# Patient Record
Sex: FEMALE | Race: WHITE | NOT HISPANIC OR LATINO | Employment: FULL TIME | URBAN - METROPOLITAN AREA
[De-identification: names, ages, dates, MRNs, and addresses within clinical notes are randomized per-mention and may not be internally consistent; named-entity substitution may affect disease eponyms.]

---

## 2017-07-13 ENCOUNTER — OFFICE VISIT (OUTPATIENT)
Dept: FAMILY MEDICINE CLINIC | Facility: CLINIC | Age: 62
End: 2017-07-13

## 2017-07-13 VITALS
HEART RATE: 60 BPM | OXYGEN SATURATION: 98 % | SYSTOLIC BLOOD PRESSURE: 118 MMHG | TEMPERATURE: 98.1 F | DIASTOLIC BLOOD PRESSURE: 76 MMHG | HEIGHT: 63 IN | WEIGHT: 189 LBS | BODY MASS INDEX: 33.49 KG/M2

## 2017-07-13 DIAGNOSIS — H10.30 ACUTE BACTERIAL CONJUNCTIVITIS, UNSPECIFIED LATERALITY: Primary | ICD-10-CM

## 2017-07-13 DIAGNOSIS — J30.1 NON-SEASONAL ALLERGIC RHINITIS DUE TO POLLEN: ICD-10-CM

## 2017-07-13 DIAGNOSIS — J45.20 MILD INTERMITTENT ASTHMA WITHOUT COMPLICATION: ICD-10-CM

## 2017-07-13 DIAGNOSIS — F32.A DEPRESSION, UNSPECIFIED DEPRESSION TYPE: ICD-10-CM

## 2017-07-13 PROCEDURE — 99214 OFFICE O/P EST MOD 30 MIN: CPT | Performed by: PHYSICIAN ASSISTANT

## 2017-07-13 RX ORDER — ESCITALOPRAM OXALATE 10 MG/1
10 TABLET ORAL DAILY
COMMUNITY
End: 2017-07-13 | Stop reason: SDUPTHER

## 2017-07-13 RX ORDER — ALBUTEROL SULFATE 90 UG/1
2 AEROSOL, METERED RESPIRATORY (INHALATION) EVERY 4 HOURS PRN
Qty: 1 INHALER | Refills: 5 | Status: SHIPPED | OUTPATIENT
Start: 2017-07-13 | End: 2018-12-27 | Stop reason: SDUPTHER

## 2017-07-13 RX ORDER — ESCITALOPRAM OXALATE 10 MG/1
10 TABLET ORAL DAILY
Qty: 30 TABLET | Refills: 11 | Status: SHIPPED | OUTPATIENT
Start: 2017-07-13 | End: 2018-06-28 | Stop reason: SDUPTHER

## 2017-07-13 RX ORDER — CIPROFLOXACIN HYDROCHLORIDE 3.5 MG/ML
1 SOLUTION/ DROPS TOPICAL
Qty: 10 ML | Refills: 0 | Status: SHIPPED | OUTPATIENT
Start: 2017-07-13 | End: 2018-01-11 | Stop reason: SDUPTHER

## 2017-07-13 RX ORDER — CYCLOBENZAPRINE HCL 10 MG
10 TABLET ORAL 3 TIMES DAILY PRN
Qty: 30 TABLET | Refills: 2 | Status: SHIPPED | OUTPATIENT
Start: 2017-07-13 | End: 2018-12-27 | Stop reason: SDUPTHER

## 2017-07-13 NOTE — PROGRESS NOTES
Subjective   Adamaris Buck is a 62 y.o. female    History of Present Illness  Patient presents today for follow-up on one uncontrolled chronic medical problem, 2 uncontrolled chronic medical problems and one new medical problem.  Regarding her depression she states Lexapro has been a life saver.  She states it works very well for her and she does not have problems at all with depression since taking this medication.  She would like to continue on this.  She denies he problems with it.  Patient has chronic mild intermittent asthma which has been bothering her more lately with flares.  She needs a refill of inhaler, her current one is .  She has been taking loratadine daily for chronic allergies and states this is not well controlled either and she feels that it is causing her asthma to flare.  In addition to that she has chronic allergic problems with itching and dryness in her eyes and lately she has developed a film over the top surface of her eyes especially her left eye has caused some blurriness to her vision.  She has been seen by ophthalmology since her symptoms began and was told she had a normal eye exam.  She has tried over-the-counter eyedrops without relief.  The following portions of the patient's history were reviewed and updated as appropriate: allergies, current medications, past social history and problem list    Review of Systems   Constitutional: Negative for appetite change, chills, fatigue and fever.   HENT: Positive for congestion, postnasal drip, rhinorrhea, sneezing and sore throat. Negative for ear pain, hearing loss, sinus pressure and trouble swallowing.    Eyes: Positive for discharge, itching and visual disturbance.   Respiratory: Positive for wheezing. Negative for cough, chest tightness and shortness of breath.    Cardiovascular: Negative for chest pain.   Gastrointestinal: Negative for abdominal pain, diarrhea and nausea.   Neurological: Negative for dizziness, tremors, weakness,  light-headedness and headaches.   Psychiatric/Behavioral: Negative for agitation, behavioral problems, confusion, decreased concentration, dysphoric mood, sleep disturbance and suicidal ideas. The patient is not nervous/anxious.        Objective     Vitals:    07/13/17 1436   BP: 118/76   Pulse: 60   Temp: 98.1 °F (36.7 °C)   SpO2: 98%       Physical Exam   Constitutional: She is oriented to person, place, and time. She appears well-developed and well-nourished. No distress.   HENT:   Head: Normocephalic and atraumatic.   Right Ear: External ear normal.   Left Ear: External ear normal.   Nose: Nose normal.   Mouth/Throat: Oropharynx is clear and moist.   Eyes: Conjunctivae and EOM are normal. Pupils are equal, round, and reactive to light. Right eye exhibits no discharge. Left eye exhibits no discharge. No scleral icterus.   Neck: No thyroid mass and no thyromegaly present.   Cardiovascular: Normal rate, regular rhythm and normal heart sounds.    Pulmonary/Chest: Effort normal and breath sounds normal.   Neurological: She is alert and oriented to person, place, and time.   Skin: She is not diaphoretic.   Psychiatric: Her speech is normal and behavior is normal. Judgment and thought content normal. Her mood appears not anxious. Her affect is not angry and not inappropriate. Cognition and memory are normal. She does not exhibit a depressed mood. She is attentive.   Nursing note and vitals reviewed.      Assessment/Plan     Diagnoses and all orders for this visit:    Acute bacterial conjunctivitis, unspecified laterality  -     ciprofloxacin (CILOXAN) 0.3 % ophthalmic solution; Administer 1 drop to both eyes Every 2 (Two) Hours. Decrease to dose to every 4 hours on day 3    Depression, unspecified depression type  -     escitalopram (LEXAPRO) 10 MG tablet; Take 1 tablet by mouth Daily.    Mild intermittent asthma without complication    Non-seasonal allergic rhinitis due to pollen    Other orders  -     Discontinue:  escitalopram (LEXAPRO) 10 MG tablet; Take 10 mg by mouth Daily.  -     Cholecalciferol (VITAMIN D3) 5000 UNITS capsule capsule; Take 5,000 Units by mouth Daily.  -     LORATADINE PO; Take 1 tablet by mouth Daily.  -     Multiple Vitamin (MULTI VITAMIN PO); Take 1 tablet by mouth Daily.  -     BIOTIN PO; Take 1 tablet by mouth Daily.  -     cyclobenzaprine (FLEXERIL) 10 MG tablet; Take 1 tablet by mouth 3 (Three) Times a Day As Needed for Muscle Spasms.  -     albuterol (PROVENTIL HFA;VENTOLIN HFA) 108 (90 BASE) MCG/ACT inhaler; Inhale 2 puffs Every 4 (Four) Hours As Needed for Wheezing.    Advised patient to discontinue loratadine and start on Zyrtec 10 mg daily over-the-counter for allergic rhinitis, prescription for pro-air inhaler to use as needed for asthma, if symptoms asthma persist will add on a steroid inhaler such as Advair.  Follow-up in one year for recheck on depression.

## 2017-09-07 ENCOUNTER — TELEPHONE (OUTPATIENT)
Dept: FAMILY MEDICINE CLINIC | Facility: CLINIC | Age: 62
End: 2017-09-07

## 2017-09-07 RX ORDER — MONTELUKAST SODIUM 10 MG/1
10 TABLET ORAL NIGHTLY
Qty: 30 TABLET | Refills: 5 | Status: SHIPPED | OUTPATIENT
Start: 2017-09-07 | End: 2017-11-01 | Stop reason: SDUPTHER

## 2017-09-07 NOTE — TELEPHONE ENCOUNTER
Please call in Advair 250/50 one puff twice daily dispensed #1 disc with 5 refills and singular 10 mg 1 daily at bedtime #30 with 5 refills.

## 2017-09-07 NOTE — TELEPHONE ENCOUNTER
----- Message from Maryam Espitia sent at 9/7/2017  9:43 AM EDT -----  Contact: PATIENT  SHE IS HAVING ASTHMA EXACERBATION AND HAD DISCUSS THIS AT LAST APPOINTMENT THAT IF IT HAPPENED TO ADD CINGULAR AND ADVAIR 250/50. CAN THOSE BE CALLED IN FOR HER ASAP TODAY.     [address] - [phone]  - [phone]  [phone] (Phone)  [phone] (Fax)

## 2017-11-01 RX ORDER — MONTELUKAST SODIUM 10 MG/1
10 TABLET ORAL NIGHTLY
Qty: 90 TABLET | Refills: 0 | Status: SHIPPED | OUTPATIENT
Start: 2017-11-01 | End: 2018-02-12 | Stop reason: SDUPTHER

## 2018-01-09 ENCOUNTER — TELEPHONE (OUTPATIENT)
Dept: FAMILY MEDICINE CLINIC | Facility: CLINIC | Age: 63
End: 2018-01-09

## 2018-01-09 NOTE — TELEPHONE ENCOUNTER
----- Message from Renaldo Lowery CMA sent at 1/9/2018  4:09 PM EST -----  Contact: PT.      ----- Message -----     From: Ximena Miller     Sent: 1/9/2018   9:42 AM       To: [facility]    PT. SEE'S DR. HINDS.  PT. IS CURRENTLY OUT OF MEDS. OF:  EYE DROPS FOR AN EYE INFECTION.  RX=[facility].  PT. CAN BE REACHED @ [facility] #.

## 2018-01-11 DIAGNOSIS — H10.30 ACUTE BACTERIAL CONJUNCTIVITIS, UNSPECIFIED LATERALITY: ICD-10-CM

## 2018-01-11 RX ORDER — CIPROFLOXACIN HYDROCHLORIDE 3.5 MG/ML
SOLUTION/ DROPS TOPICAL
Qty: 5 ML | Refills: 0 | Status: SHIPPED | OUTPATIENT
Start: 2018-01-11 | End: 2019-11-14

## 2018-02-12 RX ORDER — MONTELUKAST SODIUM 10 MG/1
TABLET ORAL
Qty: 90 TABLET | Refills: 0 | Status: SHIPPED | OUTPATIENT
Start: 2018-02-12 | End: 2018-06-28 | Stop reason: SDUPTHER

## 2018-06-28 ENCOUNTER — TELEPHONE (OUTPATIENT)
Dept: FAMILY MEDICINE CLINIC | Facility: CLINIC | Age: 63
End: 2018-06-28

## 2018-06-28 DIAGNOSIS — F32.A DEPRESSION, UNSPECIFIED DEPRESSION TYPE: ICD-10-CM

## 2018-06-28 RX ORDER — MONTELUKAST SODIUM 10 MG/1
10 TABLET ORAL NIGHTLY
Qty: 90 TABLET | Refills: 0 | Status: SHIPPED | OUTPATIENT
Start: 2018-06-28 | End: 2018-11-19 | Stop reason: SDUPTHER

## 2018-06-28 RX ORDER — ESCITALOPRAM OXALATE 10 MG/1
10 TABLET ORAL DAILY
Qty: 90 TABLET | Refills: 0 | Status: SHIPPED | OUTPATIENT
Start: 2018-06-28 | End: 2018-11-19 | Stop reason: SDUPTHER

## 2018-06-28 NOTE — TELEPHONE ENCOUNTER
Patient needs a follow up appt, KYLER     I refilled one 90 day supply on the meds requested but needs appt for additional refills

## 2018-06-28 NOTE — TELEPHONE ENCOUNTER
----- Message from Ximenavianca Miller sent at 6/28/2018  3:23 PM EDT -----  Contact: PT.  PT. SEE'S DR. HINDS.  PT. IS NEEDING A REFILL ON: escitalopram (LEXAPRO) 10 MG tablet 30 tablet 11 7/13/2017    Sig - Route: Take 1 tablet by mouth Daily. - Oral     & montelukast (SINGULAIR) 10 MG tablet 90 tablet 0 2/12/2018    Sig: TAKE 1 TABLET BY MOUTH EVERY NIGHT     & fluticasone-salmeterol (ADVAIR DISKUS) 250-50 MCG/DOSE DISKUS 3 each 2 12/26/2017    Sig - Route: Inhale 1 puff 2 (Two) Times a Day. - Inhalation     #PT. WOULD LIKE A #90 DAY SUPPLY ON ALL ABOVE MEDS.#    RX=fluticasone-salmeterol (ADVAIR DISKUS) 250-50 MCG/DOSE DISKUS 3 each 2 12/26/2017    Sig - Route: Inhale 1 puff 2 (Two) Times a Day. - Inhalation     PT. CAN BE REACHED @ [facility] #.

## 2018-11-19 DIAGNOSIS — F32.A DEPRESSION, UNSPECIFIED DEPRESSION TYPE: ICD-10-CM

## 2018-11-19 RX ORDER — MONTELUKAST SODIUM 10 MG/1
10 TABLET ORAL NIGHTLY
Qty: 90 TABLET | Refills: 0 | Status: SHIPPED | OUTPATIENT
Start: 2018-11-19 | End: 2018-12-27 | Stop reason: SDUPTHER

## 2018-11-19 RX ORDER — ESCITALOPRAM OXALATE 10 MG/1
10 TABLET ORAL DAILY
Qty: 90 TABLET | Refills: 0 | Status: SHIPPED | OUTPATIENT
Start: 2018-11-19 | End: 2018-12-27 | Stop reason: SDUPTHER

## 2018-11-19 NOTE — TELEPHONE ENCOUNTER
PATIENT HAS CALLED REQUESTING A REFILL FOR SNGULAR AND LEXAPRO TO BE SENT TO [facility]. PATIENT IS REQUESTING FOR ENOUGH REFILLS ON MEDICATION UNTIL THEY ARE CALLED INTO [facility].  SHE CAN BE REACHED [phone]

## 2018-12-27 ENCOUNTER — OFFICE VISIT (OUTPATIENT)
Dept: FAMILY MEDICINE CLINIC | Facility: CLINIC | Age: 63
End: 2018-12-27

## 2018-12-27 VITALS
HEART RATE: 61 BPM | DIASTOLIC BLOOD PRESSURE: 82 MMHG | BODY MASS INDEX: 35.7 KG/M2 | HEIGHT: 62 IN | SYSTOLIC BLOOD PRESSURE: 130 MMHG | WEIGHT: 194 LBS | TEMPERATURE: 97.8 F | OXYGEN SATURATION: 98 %

## 2018-12-27 DIAGNOSIS — F32.A DEPRESSION, UNSPECIFIED DEPRESSION TYPE: Primary | ICD-10-CM

## 2018-12-27 DIAGNOSIS — J30.1 NON-SEASONAL ALLERGIC RHINITIS DUE TO POLLEN: ICD-10-CM

## 2018-12-27 DIAGNOSIS — H04.203 EXCESSIVE TEAR PRODUCTION OF BOTH LACRIMAL GLANDS: ICD-10-CM

## 2018-12-27 DIAGNOSIS — M54.2 NECK PAIN: ICD-10-CM

## 2018-12-27 DIAGNOSIS — J45.20 MILD INTERMITTENT ASTHMA WITHOUT COMPLICATION: ICD-10-CM

## 2018-12-27 PROCEDURE — 99214 OFFICE O/P EST MOD 30 MIN: CPT | Performed by: FAMILY MEDICINE

## 2018-12-27 RX ORDER — ALBUTEROL SULFATE 90 UG/1
2 AEROSOL, METERED RESPIRATORY (INHALATION) EVERY 4 HOURS PRN
Qty: 3 INHALER | Refills: 3 | Status: SHIPPED | OUTPATIENT
Start: 2018-12-27 | End: 2019-11-14 | Stop reason: SDUPTHER

## 2018-12-27 RX ORDER — MONTELUKAST SODIUM 10 MG/1
10 TABLET ORAL NIGHTLY
Qty: 90 TABLET | Refills: 3 | Status: SHIPPED | OUTPATIENT
Start: 2018-12-27 | End: 2019-09-25 | Stop reason: SDUPTHER

## 2018-12-27 RX ORDER — ESCITALOPRAM OXALATE 10 MG/1
10 TABLET ORAL DAILY
Qty: 90 TABLET | Refills: 3 | Status: SHIPPED | OUTPATIENT
Start: 2018-12-27 | End: 2019-08-21 | Stop reason: SDUPTHER

## 2018-12-27 RX ORDER — AZITHROMYCIN 250 MG/1
TABLET, FILM COATED ORAL
Qty: 6 TABLET | Refills: 1 | Status: SHIPPED | OUTPATIENT
Start: 2018-12-27 | End: 2019-11-14

## 2018-12-27 RX ORDER — CYCLOBENZAPRINE HCL 10 MG
10 TABLET ORAL 3 TIMES DAILY PRN
Qty: 30 TABLET | Refills: 2 | Status: SHIPPED | OUTPATIENT
Start: 2018-12-27

## 2018-12-28 NOTE — PROGRESS NOTES
Subjective   Adamaris Buck is a 63 y.o. female    Chief Complaint    Depression  Asthma  Allergies  Neck pain    History of Present Illness   Patient presents today for follow-up visit.  She has not been seen for many months.  Chronic problems as listed above under chief complaint.  She is due for refill on multiple medications.  She feels she is doing well.  She feels her medications have been helpful without adverse effect.  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      The following portions of the patient's history were reviewed and updated as appropriate: allergies, current medications, past social history and problem list    Review of Systems   Constitutional: Negative for chills, fatigue and fever.   HENT: Positive for postnasal drip, rhinorrhea, sneezing and sore throat. Negative for congestion, ear pain, hearing loss, sinus pressure and trouble swallowing.    Eyes: Positive for itching.   Respiratory: Negative for cough, chest tightness, shortness of breath and wheezing.    Cardiovascular: Negative for chest pain, palpitations and leg swelling.   Gastrointestinal: Negative for abdominal pain, nausea and vomiting.   Endocrine: Negative for polydipsia, polyphagia and polyuria.   Genitourinary: Negative for dysuria, frequency and urgency.   Musculoskeletal: Negative for arthralgias, back pain and myalgias.   Skin: Negative for color change and rash.   Neurological: Negative for weakness and headaches.   Hematological: Negative for adenopathy. Does not bruise/bleed easily.       Objective     Vitals:    12/27/18 0850   BP: 130/82   Pulse: 61   Temp: 97.8 °F (36.6 °C)   SpO2: 98%       Physical Exam   Constitutional: She is oriented to person, place, and time. She appears well-developed and well-nourished. No distress.   HENT:   Head: Normocephalic and atraumatic.   Right Ear: External ear normal.   Left Ear: External ear normal.   Nose: Nose normal.    Mouth/Throat: Oropharynx is clear and moist.   Eyes: Conjunctivae are normal. Pupils are equal, round, and reactive to light.   Neck: Neck supple. No thyromegaly present.   Cardiovascular: Normal rate, regular rhythm and normal heart sounds.   No murmur heard.  Pulmonary/Chest: Effort normal and breath sounds normal. No respiratory distress. She has no wheezes. She has no rales. She exhibits no tenderness.   Abdominal: Soft. Bowel sounds are normal. There is no tenderness.   Lymphadenopathy:     She has no cervical adenopathy.   Neurological: She is alert and oriented to person, place, and time.   Skin: Skin is warm and dry. No rash noted. She is not diaphoretic.   Psychiatric: She has a normal mood and affect. Her behavior is normal.   Nursing note and vitals reviewed.      Assessment/Plan   Problem List Items Addressed This Visit     None      Visit Diagnoses     Depression, unspecified depression type    -  Primary    Relevant Medications    escitalopram (LEXAPRO) 10 MG tablet    Mild intermittent asthma without complication        Relevant Medications    albuterol sulfate  (90 Base) MCG/ACT inhaler    fluticasone-salmeterol (ADVAIR DISKUS) 250-50 MCG/DOSE DISKUS    montelukast (SINGULAIR) 10 MG tablet    Non-seasonal allergic rhinitis due to pollen        Relevant Medications    montelukast (SINGULAIR) 10 MG tablet    Neck pain        Relevant Medications    cyclobenzaprine (FLEXERIL) 10 MG tablet    Excessive tear production of both lacrimal glands

## 2019-01-02 DIAGNOSIS — F32.A DEPRESSION, UNSPECIFIED DEPRESSION TYPE: ICD-10-CM

## 2019-01-02 NOTE — TELEPHONE ENCOUNTER
----- Message from Maryam Espitia sent at 1/2/2019 12:20 PM EST -----  Contact: PATIENT  SHE SAID [facility] WILL NOT SEND HER MEDICINE TO HER BECAUSE THERE IS A PROBLEM: SHE WOULD LIKE SOMEONE TO CHECK ON THIS AND LET HER KNOW ASAP.      escitalopram (LEXAPRO) 10 MG tablet 90 tablet    Sig - Route: Take 1 tablet by mouth Daily. - Oral   Sent to pharmacy as: Escitalopram Oxalate 10 MG Oral Tablet   E-Prescribing Status: Receipt confirmed by pharmacy (12/27/2018  2:07 PM EST)   Associated Diagnoses     Depression, unspecified depression type  - Primary     Pharmacy     [facility] - [address] [phone] [facility] [phone] FX

## 2019-01-22 RX ORDER — ESCITALOPRAM OXALATE 10 MG/1
10 TABLET ORAL DAILY
Qty: 90 TABLET | Refills: 3 | OUTPATIENT
Start: 2019-01-22

## 2019-01-22 NOTE — TELEPHONE ENCOUNTER
I [facility] with a 90 day supply and 3 refills.

## 2019-08-21 DIAGNOSIS — F32.A DEPRESSION, UNSPECIFIED DEPRESSION TYPE: ICD-10-CM

## 2019-08-21 DIAGNOSIS — J45.20 MILD INTERMITTENT ASTHMA WITHOUT COMPLICATION: ICD-10-CM

## 2019-08-21 RX ORDER — ESCITALOPRAM OXALATE 10 MG/1
10 TABLET ORAL DAILY
Qty: 90 TABLET | Refills: 3 | Status: SHIPPED | OUTPATIENT
Start: 2019-08-21 | End: 2020-08-05

## 2019-09-25 DIAGNOSIS — J30.1 NON-SEASONAL ALLERGIC RHINITIS DUE TO POLLEN: ICD-10-CM

## 2019-09-25 DIAGNOSIS — J45.20 MILD INTERMITTENT ASTHMA WITHOUT COMPLICATION: ICD-10-CM

## 2019-09-25 RX ORDER — MONTELUKAST SODIUM 10 MG/1
10 TABLET ORAL NIGHTLY
Qty: 90 TABLET | Refills: 0 | Status: SHIPPED | OUTPATIENT
Start: 2019-09-25 | End: 2019-11-14 | Stop reason: SDUPTHER

## 2019-10-31 ENCOUNTER — TELEPHONE (OUTPATIENT)
Dept: FAMILY MEDICINE CLINIC | Facility: CLINIC | Age: 64
End: 2019-10-31

## 2019-11-01 NOTE — TELEPHONE ENCOUNTER
LVM for patient that due to changed office and insurance reasons we do not order labs prior to the appt. most Insurance companies have stopped paying for labs prior to appt.

## 2019-11-14 ENCOUNTER — OFFICE VISIT (OUTPATIENT)
Dept: FAMILY MEDICINE CLINIC | Facility: CLINIC | Age: 64
End: 2019-11-14

## 2019-11-14 ENCOUNTER — LAB (OUTPATIENT)
Dept: LAB | Facility: HOSPITAL | Age: 64
End: 2019-11-14

## 2019-11-14 VITALS
SYSTOLIC BLOOD PRESSURE: 112 MMHG | TEMPERATURE: 98.2 F | OXYGEN SATURATION: 98 % | HEIGHT: 62 IN | BODY MASS INDEX: 33.1 KG/M2 | WEIGHT: 179.9 LBS | DIASTOLIC BLOOD PRESSURE: 68 MMHG | HEART RATE: 65 BPM

## 2019-11-14 DIAGNOSIS — G89.29 CHRONIC RIGHT SHOULDER PAIN: ICD-10-CM

## 2019-11-14 DIAGNOSIS — Z11.59 ENCOUNTER FOR HEPATITIS C SCREENING TEST FOR LOW RISK PATIENT: ICD-10-CM

## 2019-11-14 DIAGNOSIS — F32.A DEPRESSION, UNSPECIFIED DEPRESSION TYPE: ICD-10-CM

## 2019-11-14 DIAGNOSIS — M25.511 CHRONIC RIGHT SHOULDER PAIN: ICD-10-CM

## 2019-11-14 DIAGNOSIS — Z23 IMMUNIZATION DUE: ICD-10-CM

## 2019-11-14 DIAGNOSIS — J45.30 MILD PERSISTENT ASTHMA WITHOUT COMPLICATION: ICD-10-CM

## 2019-11-14 DIAGNOSIS — J30.1 NON-SEASONAL ALLERGIC RHINITIS DUE TO POLLEN: ICD-10-CM

## 2019-11-14 DIAGNOSIS — Z12.31 BREAST CANCER SCREENING BY MAMMOGRAM: ICD-10-CM

## 2019-11-14 DIAGNOSIS — Z00.00 GENERAL MEDICAL EXAM: Primary | ICD-10-CM

## 2019-11-14 DIAGNOSIS — Z00.00 GENERAL MEDICAL EXAM: ICD-10-CM

## 2019-11-14 LAB
ANION GAP SERPL CALCULATED.3IONS-SCNC: 11.7 MMOL/L (ref 5–15)
BUN BLD-MCNC: 15 MG/DL (ref 8–23)
BUN/CREAT SERPL: 16.5 (ref 7–25)
CALCIUM SPEC-SCNC: 10.3 MG/DL (ref 8.6–10.5)
CHLORIDE SERPL-SCNC: 106 MMOL/L (ref 98–107)
CHOLEST SERPL-MCNC: 151 MG/DL (ref 0–200)
CO2 SERPL-SCNC: 27.3 MMOL/L (ref 22–29)
CREAT BLD-MCNC: 0.91 MG/DL (ref 0.57–1)
DEPRECATED RDW RBC AUTO: 42.2 FL (ref 37–54)
ERYTHROCYTE [DISTWIDTH] IN BLOOD BY AUTOMATED COUNT: 12.6 % (ref 12.3–15.4)
GFR SERPL CREATININE-BSD FRML MDRD: 62 ML/MIN/1.73
GLUCOSE BLD-MCNC: 98 MG/DL (ref 65–99)
HCT VFR BLD AUTO: 43.9 % (ref 34–46.6)
HCV AB SER DONR QL: NORMAL
HDLC SERPL-MCNC: 60 MG/DL (ref 40–60)
HGB BLD-MCNC: 14.7 G/DL (ref 12–15.9)
LDLC SERPL CALC-MCNC: 71 MG/DL (ref 0–100)
LDLC/HDLC SERPL: 1.18 {RATIO}
MCH RBC QN AUTO: 30.4 PG (ref 26.6–33)
MCHC RBC AUTO-ENTMCNC: 33.5 G/DL (ref 31.5–35.7)
MCV RBC AUTO: 90.9 FL (ref 79–97)
PLATELET # BLD AUTO: 299 10*3/MM3 (ref 140–450)
PMV BLD AUTO: 10.5 FL (ref 6–12)
POTASSIUM BLD-SCNC: 4.3 MMOL/L (ref 3.5–5.2)
RBC # BLD AUTO: 4.83 10*6/MM3 (ref 3.77–5.28)
SODIUM BLD-SCNC: 145 MMOL/L (ref 136–145)
TRIGL SERPL-MCNC: 100 MG/DL (ref 0–150)
TSH SERPL DL<=0.05 MIU/L-ACNC: 1.25 UIU/ML (ref 0.27–4.2)
VLDLC SERPL-MCNC: 20 MG/DL (ref 5–40)
WBC NRBC COR # BLD: 8.07 10*3/MM3 (ref 3.4–10.8)

## 2019-11-14 PROCEDURE — 85027 COMPLETE CBC AUTOMATED: CPT | Performed by: PHYSICIAN ASSISTANT

## 2019-11-14 PROCEDURE — 86803 HEPATITIS C AB TEST: CPT

## 2019-11-14 PROCEDURE — 80048 BASIC METABOLIC PNL TOTAL CA: CPT

## 2019-11-14 PROCEDURE — 80061 LIPID PANEL: CPT

## 2019-11-14 PROCEDURE — 36415 COLL VENOUS BLD VENIPUNCTURE: CPT | Performed by: PHYSICIAN ASSISTANT

## 2019-11-14 PROCEDURE — 99397 PER PM REEVAL EST PAT 65+ YR: CPT | Performed by: PHYSICIAN ASSISTANT

## 2019-11-14 PROCEDURE — 84443 ASSAY THYROID STIM HORMONE: CPT

## 2019-11-14 RX ORDER — ALBUTEROL SULFATE 90 UG/1
2 AEROSOL, METERED RESPIRATORY (INHALATION) EVERY 4 HOURS PRN
Qty: 3 INHALER | Refills: 3 | Status: SHIPPED | OUTPATIENT
Start: 2019-11-14 | End: 2022-09-20

## 2019-11-14 RX ORDER — MONTELUKAST SODIUM 10 MG/1
10 TABLET ORAL NIGHTLY
Qty: 90 TABLET | Refills: 3 | Status: SHIPPED | OUTPATIENT
Start: 2019-11-14 | End: 2022-08-19

## 2019-11-14 NOTE — PROGRESS NOTES
Talat Buck is a 64 y.o. female  Annual Exam (Annual Physical with labs )      History of Present Illness  Patient presents today for a preventive medical visit.  Patient is here to determine screening labs and tests that are due and to determine immunization status as well.  Patient will be counseled regarding preventative medicine issues such as regular exercise and  healthy diet as well.  The following portions of the patient's history were reviewed and updated as appropriate: allergies, current medications, past social history and problem list    Review of Systems   Constitutional: Positive for activity change.   HENT: Negative.    Eyes: Negative.    Respiratory: Negative.    Cardiovascular: Negative.    Gastrointestinal: Negative.    Endocrine: Negative.    Genitourinary: Negative.    Musculoskeletal: Positive for arthralgias and myalgias. Negative for gait problem and joint swelling.   Skin: Negative.    Allergic/Immunologic: Negative.    Neurological: Negative.  Negative for weakness and numbness.   Hematological: Negative.    Psychiatric/Behavioral: Negative.    All other systems reviewed and are negative.      Objective     Vitals:    11/14/19 1040   BP: 112/68   Pulse: 65   Temp: 98.2 °F (36.8 °C)   SpO2: 98%       Physical Exam   Constitutional: She is oriented to person, place, and time. She appears well-developed and well-nourished. No distress.   HENT:   Head: Normocephalic and atraumatic.   Right Ear: External ear normal.   Left Ear: External ear normal.   Nose: Nose normal.   Mouth/Throat: Oropharynx is clear and moist.   Eyes: Conjunctivae and EOM are normal. Pupils are equal, round, and reactive to light.   Neck: Normal range of motion. Neck supple. No JVD present. Carotid bruit is not present. No thyromegaly present.   Cardiovascular: Normal rate, regular rhythm, normal heart sounds and intact distal pulses.   No murmur heard.  Pulmonary/Chest: Effort normal and breath sounds normal.  No respiratory distress.   Abdominal: Soft. Bowel sounds are normal. She exhibits no mass. There is no tenderness.   Musculoskeletal: Normal range of motion. She exhibits tenderness ( Mild tenderness right shoulder). She exhibits no edema.   Lymphadenopathy:     She has no cervical adenopathy.   Neurological: She is alert and oriented to person, place, and time. She has normal reflexes. She displays normal reflexes. No cranial nerve deficit or sensory deficit. She exhibits normal muscle tone. Coordination normal.   Skin: Skin is warm and dry. She is not diaphoretic. No pallor.   Psychiatric: She has a normal mood and affect. Her behavior is normal. Judgment and thought content normal.   Nursing note and vitals reviewed.    Discussed preventative medicine issues with patient including regular exercise, healthy diet, stress reduction, adequate sleep and recommended age-appropriate screening studies.  Assessment/Plan     Diagnoses and all orders for this visit:    General medical exam  -     Basic Metabolic Panel; Future  -     Lipid Panel; Future  -     TSH; Future  -     CBC (No Diff)    Mild persistent asthma without complication  -     fluticasone-salmeterol (ADVAIR DISKUS) 250-50 MCG/DOSE DISKUS; Inhale 1 puff 2 (Two) Times a Day.  -     montelukast (SINGULAIR) 10 MG tablet; Take 1 tablet by mouth Every Night.  -     albuterol sulfate  (90 Base) MCG/ACT inhaler; Inhale 2 puffs Every 4 (Four) Hours As Needed for Wheezing.    Non-seasonal allergic rhinitis due to pollen  -     montelukast (SINGULAIR) 10 MG tablet; Take 1 tablet by mouth Every Night.    Chronic right shoulder pain    Encounter for hepatitis C screening test for low risk patient  -     Hepatitis C Antibody; Future    Breast cancer screening by mammogram  -     Mammo Screening Bilateral With CAD; Future    Depression, unspecified depression type

## 2020-08-04 DIAGNOSIS — F32.A DEPRESSION, UNSPECIFIED DEPRESSION TYPE: ICD-10-CM

## 2020-08-05 RX ORDER — ESCITALOPRAM OXALATE 10 MG/1
TABLET ORAL
Qty: 90 TABLET | Refills: 3 | Status: SHIPPED | OUTPATIENT
Start: 2020-08-05

## 2021-07-19 DIAGNOSIS — F32.A DEPRESSION, UNSPECIFIED DEPRESSION TYPE: ICD-10-CM

## 2021-07-20 RX ORDER — ESCITALOPRAM OXALATE 10 MG/1
TABLET ORAL
Qty: 90 TABLET | Refills: 3 | OUTPATIENT
Start: 2021-07-20

## 2022-08-19 ENCOUNTER — OFFICE VISIT (OUTPATIENT)
Dept: FAMILY MEDICINE CLINIC | Facility: CLINIC | Age: 67
End: 2022-08-19

## 2022-08-19 ENCOUNTER — HOSPITAL ENCOUNTER (OUTPATIENT)
Dept: GENERAL RADIOLOGY | Facility: HOSPITAL | Age: 67
Discharge: HOME | End: 2022-08-19
Admitting: PHYSICIAN ASSISTANT

## 2022-08-19 VITALS
SYSTOLIC BLOOD PRESSURE: 110 MMHG | OXYGEN SATURATION: 98 % | DIASTOLIC BLOOD PRESSURE: 62 MMHG | HEART RATE: 81 BPM | BODY MASS INDEX: 35.7 KG/M2 | WEIGHT: 194 LBS | HEIGHT: 62 IN | TEMPERATURE: 97 F

## 2022-08-19 DIAGNOSIS — J30.1 NON-SEASONAL ALLERGIC RHINITIS DUE TO POLLEN: ICD-10-CM

## 2022-08-19 DIAGNOSIS — J45.40 MODERATE PERSISTENT ASTHMA WITHOUT COMPLICATION: ICD-10-CM

## 2022-08-19 DIAGNOSIS — G89.29 CHRONIC RIGHT SHOULDER PAIN: ICD-10-CM

## 2022-08-19 DIAGNOSIS — J45.40 MODERATE PERSISTENT ASTHMA WITHOUT COMPLICATION: Primary | ICD-10-CM

## 2022-08-19 DIAGNOSIS — R05.3 CHRONIC COUGH: ICD-10-CM

## 2022-08-19 DIAGNOSIS — M25.511 CHRONIC RIGHT SHOULDER PAIN: ICD-10-CM

## 2022-08-19 PROCEDURE — 71046 X-RAY EXAM CHEST 2 VIEWS: CPT

## 2022-08-19 PROCEDURE — 99214 OFFICE O/P EST MOD 30 MIN: CPT | Performed by: PHYSICIAN ASSISTANT

## 2022-08-19 RX ORDER — MONTELUKAST SODIUM 10 MG/1
10 TABLET ORAL NIGHTLY
Qty: 30 TABLET | Refills: 11 | Status: SHIPPED | OUTPATIENT
Start: 2022-08-19 | End: 2022-08-19 | Stop reason: SDUPTHER

## 2022-08-19 RX ORDER — MONTELUKAST SODIUM 10 MG/1
10 TABLET ORAL NIGHTLY
Qty: 90 TABLET | Refills: 3 | Status: SHIPPED | OUTPATIENT
Start: 2022-08-19

## 2022-08-19 NOTE — PROGRESS NOTES
Subjective   Adamaris Buck is a 67 y.o. female  Asthma (Increased asthma flares and SOB since having Covid in January )      History of Present Illness    Adamaris Elizondo, 1955, coming in for evaluation of asthma. The patient states she has been having trouble with her asthma all year. She is noncompliant and does not like to come in. The patient states she has had to use her rescue inhaler less frequently this past week than she did 2 to 3 weeks before. She tries to use her Advair at least once a day, but she ran out of it and got it back. The patient states she is on the Advair, she does not breathe well. She has been having to use her rescue inhaler quite a bit. The patient states she has been better this week, but it has been a struggle for months. She states it will catch her throat and she will start coughing. The patient states after the fit leaves, she will clear her throat for half an hour because it feels like the albuterol is pushing something up, she will be tight and restricted in her chest. The patient is good today. She states she has been having upper airway secretions multiple times a day. The patient will have to expectorate and swallow it, but she does not spit it out. She states it feels thin and junky. The patient denies any fever. She states this has been going on since 01/2022 when she had COVID-19. The patient has gotten worse the last couple of months. She travels every week for work. 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 She states it does not seem like it is just when she is staying in a hotel. The patient states it does not feel like it is acid reflux. She states she does have heartburn once in a while. The patient sleeps with her head up. She states it is not exertional. The patient states she gets out of breath, dizzy with walking, or feeling crushing heaviness when she is walking. She states it does not seem cardiac. The patient has  not taken Singulair since her last visit. She states it seemed to help when she was on it. The patient has problems with allergies in the winter.    The patient states she went to someone in [place] because it was so hard to get it. She states it took 45 minutes. The patient states they did not do a chest x-ray, but she had a mammogram and a biopsy, little spots were benign. The patient states she had a follow-up mammogram, and it was fine.    The patient states she would like a prescription for an EpiPen. She states she has one, but it  2 years. The patient states she is not allergic to anything. She states she got stung twice by a black wasp, swelling now going down. She did not have breathing trouble with that.    The patient states when she takes a large pill, she feels like the structure balloons are getting stuck in her esophagus. She states it feels like she wants to choke and cough. The patient states this has been going on very infrequently. She states when it happens, it feels like she is going to die. The patient states she can still breathe, but she can hear it. She states she had a scope when it was first a problem, but they did not see a thing. The patient states she drinks a lot of water and wakes up. She states it has happened once or twice this year.    The patient states she injured her shoulder in 2021. She states it took months to heal. The patient states she reinjured it a little bit. She states she is doing light weights and it seems to be improving.    The following portions of the patient's history were reviewed and updated as appropriate: allergies, current medications, past social history and problem list    Review of Systems   Constitutional: Negative for fatigue and fever.   HENT: Positive for trouble swallowing. Negative for congestion.    Respiratory: Positive for cough, chest tightness, shortness of breath and wheezing.    Cardiovascular: Positive for chest pain.    Gastrointestinal: Negative.    Musculoskeletal: Positive for arthralgias ( right shoulder, improving with exercise).       Objective     Vitals:    08/19/22 1439   BP: 110/62   Pulse: 81   Temp: 97 °F (36.1 °C)   SpO2: 98%       Physical Exam  Vitals and nursing note reviewed.   Constitutional:       General: She is not in acute distress.     Appearance: Normal appearance. She is well-developed. She is obese. She is not ill-appearing, toxic-appearing or diaphoretic.      Comments: BMI 35   HENT:      Head: Normocephalic and atraumatic.   Cardiovascular:      Rate and Rhythm: Normal rate and regular rhythm.      Heart sounds: Normal heart sounds. No murmur heard.  Pulmonary:      Effort: Pulmonary effort is normal. No respiratory distress.      Breath sounds: Normal breath sounds. No wheezing or rales.   Chest:      Chest wall: No tenderness.   Musculoskeletal:         General: Normal range of motion.   Skin:     General: Skin is warm and dry.   Neurological:      Mental Status: She is alert and oriented to person, place, and time.   Psychiatric:         Mood and Affect: Mood normal.         Behavior: Behavior normal.         Assessment & Plan     Diagnoses and all orders for this visit:    1. Moderate persistent asthma without complication (Primary)  -     XR Chest PA & Lateral; Future  -     Ambulatory Referral to Pulmonology    2. Non-seasonal allergic rhinitis due to pollen  -     Discontinue: montelukast (SINGULAIR) 10 MG tablet; Take 1 tablet by mouth Every Night. For asthma and allergies  Dispense: 30 tablet; Refill: 11  -     montelukast (SINGULAIR) 10 MG tablet; Take 1 tablet by mouth Every Night. For asthma and allergies  Dispense: 90 tablet; Refill: 3    3. Chronic cough  -     XR Chest PA & Lateral; Future  -     Ambulatory Referral to Pulmonology    4. Chronic right shoulder pain     The patient will restart the Singulair. She will use the Advair twice a day for the next couple of weeks. If that does  not turn this thing around, she will need to see pulmonary and get full pulmonary function testing. In fact, I am going to go ahead and put the referral in because it takes a week for them to even call her and then they will say our next appointment is 3 or 4 weeks from now. I am going to get the chest x-ray ordered where she can get that today while she is here next week. If she is not remarkably better, then we have her lined up to see pulmonary to get pulmonary function testing and see what we need to do to get her before we go into the fall. I am putting this order in for the chest x-ray where she can just go on over whenever she feels up to.    I will send in a prescription for EpiPen.    Transcribed from ambient dictation for Debo Reyes PA-C by NORMA BRIONES.  08/19/22   16:04 EDT    Patient verbalized consent to the visit recording.  I have personally performed the services described in this document as transcribed by the above individual, and it is both accurate and complete.  Debo Reyes PA-C  8/23/2022  10:49 EDT

## 2022-09-20 ENCOUNTER — OFFICE VISIT (OUTPATIENT)
Dept: PULMONOLOGY | Facility: CLINIC | Age: 67
End: 2022-09-20

## 2022-09-20 VITALS
SYSTOLIC BLOOD PRESSURE: 140 MMHG | HEIGHT: 62 IN | OXYGEN SATURATION: 96 % | HEART RATE: 50 BPM | BODY MASS INDEX: 35.33 KG/M2 | WEIGHT: 192 LBS | RESPIRATION RATE: 16 BRPM | DIASTOLIC BLOOD PRESSURE: 78 MMHG | TEMPERATURE: 96.9 F

## 2022-09-20 DIAGNOSIS — J45.40 MODERATE PERSISTENT ASTHMA WITHOUT COMPLICATION: Primary | ICD-10-CM

## 2022-09-20 LAB
BASOPHILS # BLD AUTO: 0.05 10*3/MM3 (ref 0–0.2)
BASOPHILS NFR BLD AUTO: 0.6 % (ref 0–1.5)
DEPRECATED RDW RBC AUTO: 41.4 FL (ref 37–54)
EOSINOPHIL # BLD AUTO: 0.24 10*3/MM3 (ref 0–0.4)
EOSINOPHIL NFR BLD AUTO: 2.7 % (ref 0.3–6.2)
ERYTHROCYTE [DISTWIDTH] IN BLOOD BY AUTOMATED COUNT: 12.4 % (ref 12.3–15.4)
HCT VFR BLD AUTO: 44.6 % (ref 34–46.6)
HGB BLD-MCNC: 15.1 G/DL (ref 12–15.9)
IMM GRANULOCYTES # BLD AUTO: 0.03 10*3/MM3 (ref 0–0.05)
IMM GRANULOCYTES NFR BLD AUTO: 0.3 % (ref 0–0.5)
LYMPHOCYTES # BLD AUTO: 3.29 10*3/MM3 (ref 0.7–3.1)
LYMPHOCYTES NFR BLD AUTO: 37.3 % (ref 19.6–45.3)
MCH RBC QN AUTO: 31 PG (ref 26.6–33)
MCHC RBC AUTO-ENTMCNC: 33.9 G/DL (ref 31.5–35.7)
MCV RBC AUTO: 91.6 FL (ref 79–97)
MONOCYTES # BLD AUTO: 0.55 10*3/MM3 (ref 0.1–0.9)
MONOCYTES NFR BLD AUTO: 6.2 % (ref 5–12)
NEUTROPHILS NFR BLD AUTO: 4.65 10*3/MM3 (ref 1.7–7)
NEUTROPHILS NFR BLD AUTO: 52.9 % (ref 42.7–76)
NRBC BLD AUTO-RTO: 0 /100 WBC (ref 0–0.2)
PLATELET # BLD AUTO: 293 10*3/MM3 (ref 140–450)
PMV BLD AUTO: 10.7 FL (ref 6–12)
RBC # BLD AUTO: 4.87 10*6/MM3 (ref 3.77–5.28)
WBC NRBC COR # BLD: 8.81 10*3/MM3 (ref 3.4–10.8)

## 2022-09-20 PROCEDURE — 94729 DIFFUSING CAPACITY: CPT | Performed by: INTERNAL MEDICINE

## 2022-09-20 PROCEDURE — 82103 ALPHA-1-ANTITRYPSIN TOTAL: CPT | Performed by: INTERNAL MEDICINE

## 2022-09-20 PROCEDURE — 82785 ASSAY OF IGE: CPT | Performed by: INTERNAL MEDICINE

## 2022-09-20 PROCEDURE — 94060 EVALUATION OF WHEEZING: CPT | Performed by: INTERNAL MEDICINE

## 2022-09-20 PROCEDURE — 94726 PLETHYSMOGRAPHY LUNG VOLUMES: CPT | Performed by: INTERNAL MEDICINE

## 2022-09-20 PROCEDURE — 99204 OFFICE O/P NEW MOD 45 MIN: CPT | Performed by: INTERNAL MEDICINE

## 2022-09-20 PROCEDURE — 85025 COMPLETE CBC W/AUTO DIFF WBC: CPT | Performed by: INTERNAL MEDICINE

## 2022-09-20 RX ORDER — ALBUTEROL SULFATE 90 UG/1
2 AEROSOL, METERED RESPIRATORY (INHALATION) EVERY 4 HOURS PRN
Qty: 18 G | Refills: 11 | Status: SHIPPED | OUTPATIENT
Start: 2022-09-20

## 2022-09-20 RX ORDER — ALBUTEROL SULFATE 90 UG/1
4 AEROSOL, METERED RESPIRATORY (INHALATION) ONCE
Status: COMPLETED | OUTPATIENT
Start: 2022-09-20 | End: 2022-09-20

## 2022-09-20 RX ORDER — PANTOPRAZOLE SODIUM 40 MG/1
40 TABLET, DELAYED RELEASE ORAL DAILY
Qty: 90 TABLET | Refills: 1 | Status: SHIPPED | OUTPATIENT
Start: 2022-09-20 | End: 2023-02-07 | Stop reason: SDUPTHER

## 2022-09-20 RX ORDER — FLUTICASONE PROPIONATE AND SALMETEROL 250; 50 UG/1; UG/1
1 POWDER RESPIRATORY (INHALATION)
Qty: 60 EACH | Refills: 5 | Status: SHIPPED | OUTPATIENT
Start: 2022-09-20

## 2022-09-20 RX ADMIN — ALBUTEROL SULFATE 4 PUFF: 90 AEROSOL, METERED RESPIRATORY (INHALATION) at 15:02

## 2022-09-20 NOTE — PROGRESS NOTES
"     New Pulmonary Patient Office Visit      Patient Name: Adamaris Buck    Referring Physician: Debo Reyes PA-C    Chief Complaint:  Asthma management    Subjective     History of Present Illness: Adamaris Buck is a 67 y.o. female who is here today to establish care with Pulmonary.  Patient has longstanding history of allergic asthma.  For the most part, symptoms have been controlled with as needed albuterol and trigger avoidance.  Patient has also been started on Flonase and montelukast by PCP.  Additionally she has used Advair intermittently since 2018/19 but notes that compliance with this medication has been limited.  Today patient endorses persistent cough for >6x months that has been uncontrollable on current regimen; worsening possibly coinciding with COVID-19 infection in January 2022..  Patient unclear if cough is solely due to underlying asthma or another etiology.  Cough is dry, nonproductive and most commonly occurs with exertion and allergic triggers.  Patient also notes that these episodes oftentimes occur while exerting herself in conjunction with wearing a surgical facemask.  For months she has had approximately 1-2x episodes weekly where she will wake up at night coughing.  She denies snoring, daytime somnolence/fatigue and apnea.  She uses albuterol approximately 1 to 2 days weekly--though on days of use will administer multiple times.  Cough is often accompanied by dyspnea and audible wheezing which are both improved with beta agonist bronchodilator.  Patient denies chest pain/pressure, syncope, presyncope, nausea, vomiting, diarrhea, weight changes, skin findings, lymphadenopathy.  While she denies overt epigastric pain, heartburn, pain with swallowing, difficulty with the swallowing--she does endorse a frequent feeling of, \"something being stuck in the back of my throat\"--which often leads to persistent throat clearing and episodic cough.    Review of Systems: Fourteen point review of " system performed and negative except for that mentioned in HPI.     Past Medical History:   Past Medical History:   Diagnosis Date   • COVID-19        Past Surgical History: No past surgical history on file.    Family History: No family history on file.    Social History:   Social History     Socioeconomic History   • Marital status:    Tobacco Use   • Smoking status: Never Smoker   • Smokeless tobacco: Never Used   Substance and Sexual Activity   • Alcohol use: Yes   • Drug use: No       Medications:     Current Outpatient Medications:   •  albuterol sulfate  (90 Base) MCG/ACT inhaler, Inhale 2 puffs Every 4 (Four) Hours As Needed for Wheezing., Disp: 3 inhaler, Rfl: 3  •  BIOTIN PO, Take 1 tablet by mouth Daily., Disp: , Rfl:   •  Cholecalciferol (VITAMIN D3) 5000 UNITS capsule capsule, Take 5,000 Units by mouth Daily., Disp: , Rfl:   •  cyclobenzaprine (FLEXERIL) 10 MG tablet, Take 1 tablet by mouth 3 (Three) Times a Day As Needed for Muscle Spasms., Disp: 30 tablet, Rfl: 2  •  escitalopram (LEXAPRO) 10 MG tablet, TAKE 1 TABLET DAILY, Disp: 90 tablet, Rfl: 3  •  fluticasone-salmeterol (ADVAIR DISKUS) 250-50 MCG/DOSE DISKUS, Inhale 1 puff 2 (Two) Times a Day., Disp: 3 each, Rfl: 3  •  montelukast (SINGULAIR) 10 MG tablet, Take 1 tablet by mouth Every Night. For asthma and allergies, Disp: 90 tablet, Rfl: 3    Allergies:   Allergies   Allergen Reactions   • Other      Uricholine       Objective     Physical Exam:  Vital Signs:   Vitals:    09/20/22 1223   BP: 140/78   Pulse: 50   Resp: 16   Temp: 96.9 °F (36.1 °C)   SpO2: 96%       General: The patient appears in no acute distress.  Alert, cooperative and interactive.   HEENT:NC/AT, PERRL, Normal nasal mucosa, MMM.  Neck: Trachea midline, No masses, No JVD.  Lymphadenopathy: No palpable anterior cervical, submandibular, submental, or posterior cervical lymphadenopathy.  No palpable supra- or infraclavicular lymphadenopathy.   Chest: Clear to  auscultation bilaterally, No wheezing, rhonchi, or rales.  No egophony, No end-expiratory wheeze with forced expiratory maneuvers.  Normal work of breathing. Equal chest rise.  Cardiac: Regular rhythm, normal rate, S1S2 auscultated. No murmurs, rubs or gallops.   Abdomen: Soft, non-tender, non-distended, positive bowel sounds in all four quadrants.   Extremities: No lower extremity edema. No clubbing or cyanosis.  Skin: No rashes, open wounds, or bruising.  Warm, dry, well-perfused.  Neuro: Motor power grossly intact bilaterally. Sensation intact.  Speech fluid and fluent.  Thought process coherent.  Psych: Alert and oriented x 3, Mood stable.    PFT (9/20/2022):   -Personal review/interpretation: Mild airflow obstruction (FEV1/FVC 67; FEV1 93% predicted).  No restriction.  Normal diffusing capacity.    Assessment / Plan      Assessment / Plan:   #Moderate, persistent asthma  #Chronic cough   -Episodic cough is likely due to underlying moderate/persistent asthma.  For now we will continue Advair use and as needed albuterol.  Counseled on consistent Advair use during interval before next visit.  Also spoke with patient about keeping journal to identify triggers, number of nighttime awakenings and albuterol use as we work towards developing appropriate asthma care plan  -Given obesity, swallowing symptoms, throat clearing feel that 3-month trial of PPI for GERD related cough also very reasonable at this time.  Discussed risks/benefits with patient concerning addition of this medication.  Patient understandably hesitant about addition of daily PPI  -Asthma lab evaluation ordered today with CBC with differential, IgE level.  Will also obtain alpha-1 antitrypsin given baseline obstruction on PFTs in a non-smoker     Follow Up: 3 months     Willy Oliveira MD  Pulmonary Critical Care and Sleep Medicine

## 2022-09-21 LAB — ALPHA1 GLOB MFR UR ELPH: 119 MG/DL (ref 90–200)

## 2022-09-26 LAB — IGE SERPL-ACNC: 8 IU/ML (ref 6–495)

## 2022-10-13 DIAGNOSIS — F32.A DEPRESSION, UNSPECIFIED DEPRESSION TYPE: ICD-10-CM

## 2022-10-13 RX ORDER — ESCITALOPRAM OXALATE 10 MG/1
TABLET ORAL
Qty: 90 TABLET | Refills: 0 | OUTPATIENT
Start: 2022-10-13

## 2022-11-22 ENCOUNTER — TELEPHONE (OUTPATIENT)
Dept: FAMILY MEDICINE CLINIC | Facility: CLINIC | Age: 67
End: 2022-11-22

## 2022-11-22 NOTE — TELEPHONE ENCOUNTER
PATIENT CALLED TO SEE ABOUT BEING WORKED IN FOR A VIDEO VISIT SO THAT SHE CAN GET MEDICATION CALLED IN. SAID THAT SHE IS HAVING BACK SPASMS AND IN A LOT OF PAIN. SHE IS ALSO RUNNING A FEVER. WOULD LIKE SOMETHING SENT IN FOR PAIN AND SOMETHING FOR THE FEVER. SAID SHE CANNOT GO FIVE OR SIX DAYS WITHOUT ANY MEDS.     [facility] [address] [place] [address] - [address] [place], [place] [address]ON [facility] - [phone]  - [phone]    [address] [facility] [address]   Phone: [phone] Fax: [phone]

## 2022-11-28 NOTE — TELEPHONE ENCOUNTER
+ Patient will need an in person appointment to assess for this is sounds like she needs to be tested for the flu and also needs a physical exam to determine the cause of her back pain.  If we do not have any openings would recommend going to urgent care. SSKI Counseling:  I discussed with the patient the risks of SSKI including but not limited to thyroid abnormalities, metallic taste, GI upset, fever, headache, acne, arthralgias, paraesthesias, lymphadenopathy, easy bleeding, arrhythmias, and allergic reaction.

## 2023-02-07 RX ORDER — PANTOPRAZOLE SODIUM 40 MG/1
40 TABLET, DELAYED RELEASE ORAL DAILY
Qty: 90 TABLET | Refills: 1 | Status: SHIPPED | OUTPATIENT
Start: 2023-02-07 | End: 2023-03-20 | Stop reason: SDUPTHER

## 2023-03-20 DIAGNOSIS — K21.9 GERD WITHOUT ESOPHAGITIS: Primary | ICD-10-CM

## 2023-03-20 RX ORDER — PANTOPRAZOLE SODIUM 40 MG/1
40 TABLET, DELAYED RELEASE ORAL DAILY
Qty: 90 TABLET | Refills: 1 | Status: SHIPPED | OUTPATIENT
Start: 2023-03-20 | End: 2023-09-16

## 2023-05-19 ENCOUNTER — TELEPHONE (OUTPATIENT)
Dept: PULMONOLOGY | Facility: CLINIC | Age: 68
End: 2023-05-19
Payer: COMMERCIAL

## 2023-05-19 RX ORDER — FLUTICASONE PROPIONATE AND SALMETEROL 250; 50 UG/1; UG/1
1 POWDER RESPIRATORY (INHALATION)
Qty: 60 EACH | Refills: 5 | Status: SHIPPED | OUTPATIENT
Start: 2023-05-19 | End: 2023-05-22 | Stop reason: SDUPTHER

## 2023-05-22 RX ORDER — FLUTICASONE PROPIONATE AND SALMETEROL 250; 50 UG/1; UG/1
1 POWDER RESPIRATORY (INHALATION)
Qty: 60 EACH | Refills: 7 | Status: SHIPPED | OUTPATIENT
Start: 2023-05-22

## 2023-05-26 ENCOUNTER — TELEPHONE (OUTPATIENT)
Dept: FAMILY MEDICINE CLINIC | Facility: CLINIC | Age: 68
End: 2023-05-26
Payer: COMMERCIAL

## 2023-05-26 NOTE — TELEPHONE ENCOUNTER
We have gotten a refill request for her depression medication. I have denied this rx due to her needing to be seen. It is showing in our system that she is seeing another provider. I am denying this medication. TF

## 2023-05-30 ENCOUNTER — OFFICE VISIT (OUTPATIENT)
Dept: FAMILY MEDICINE CLINIC | Facility: CLINIC | Age: 68
End: 2023-05-30

## 2023-05-30 ENCOUNTER — OFFICE VISIT (OUTPATIENT)
Dept: PULMONOLOGY | Facility: CLINIC | Age: 68
End: 2023-05-30

## 2023-05-30 VITALS
BODY MASS INDEX: 36.42 KG/M2 | HEIGHT: 62 IN | TEMPERATURE: 97.9 F | HEART RATE: 64 BPM | SYSTOLIC BLOOD PRESSURE: 116 MMHG | WEIGHT: 197.9 LBS | DIASTOLIC BLOOD PRESSURE: 74 MMHG | OXYGEN SATURATION: 97 %

## 2023-05-30 VITALS
TEMPERATURE: 98 F | OXYGEN SATURATION: 97 % | BODY MASS INDEX: 36.47 KG/M2 | HEART RATE: 62 BPM | HEIGHT: 62 IN | WEIGHT: 198.2 LBS | DIASTOLIC BLOOD PRESSURE: 84 MMHG | SYSTOLIC BLOOD PRESSURE: 116 MMHG

## 2023-05-30 DIAGNOSIS — F32.A DEPRESSION, UNSPECIFIED DEPRESSION TYPE: ICD-10-CM

## 2023-05-30 DIAGNOSIS — M54.50 LOW BACK PAIN, UNSPECIFIED BACK PAIN LATERALITY, UNSPECIFIED CHRONICITY, UNSPECIFIED WHETHER SCIATICA PRESENT: Primary | ICD-10-CM

## 2023-05-30 DIAGNOSIS — R06.09 DYSPNEA ON EXERTION: Primary | ICD-10-CM

## 2023-05-30 DIAGNOSIS — N39.0 URINARY TRACT INFECTION WITHOUT HEMATURIA, SITE UNSPECIFIED: ICD-10-CM

## 2023-05-30 LAB
BILIRUB BLD-MCNC: NEGATIVE MG/DL
CLARITY, POC: CLEAR
COLOR UR: YELLOW
D DIMER PPP FEU-MCNC: 0.3 MCGFEU/ML (ref 0–0.69)
EXPIRATION DATE: ABNORMAL
GLUCOSE UR STRIP-MCNC: NEGATIVE MG/DL
KETONES UR QL: NEGATIVE
LEUKOCYTE EST, POC: NEGATIVE
Lab: ABNORMAL
NITRITE UR-MCNC: POSITIVE MG/ML
PH UR: 6 [PH] (ref 5–8)
PROT UR STRIP-MCNC: NEGATIVE MG/DL
RBC # UR STRIP: NEGATIVE /UL
SP GR UR: 1.01 (ref 1–1.03)
UROBILINOGEN UR QL: NORMAL

## 2023-05-30 PROCEDURE — 85379 FIBRIN DEGRADATION QUANT: CPT | Performed by: INTERNAL MEDICINE

## 2023-05-30 PROCEDURE — 87077 CULTURE AEROBIC IDENTIFY: CPT | Performed by: PHYSICIAN ASSISTANT

## 2023-05-30 PROCEDURE — 87086 URINE CULTURE/COLONY COUNT: CPT | Performed by: PHYSICIAN ASSISTANT

## 2023-05-30 PROCEDURE — 87186 SC STD MICRODIL/AGAR DIL: CPT | Performed by: PHYSICIAN ASSISTANT

## 2023-05-30 RX ORDER — SULFAMETHOXAZOLE AND TRIMETHOPRIM 800; 160 MG/1; MG/1
1 TABLET ORAL 2 TIMES DAILY
Qty: 14 TABLET | Refills: 0 | Status: SHIPPED | OUTPATIENT
Start: 2023-05-30

## 2023-05-30 RX ORDER — ESCITALOPRAM OXALATE 10 MG/1
10 TABLET ORAL DAILY
Qty: 30 TABLET | Refills: 0 | Status: SHIPPED | OUTPATIENT
Start: 2023-05-30 | End: 2023-05-30 | Stop reason: SDUPTHER

## 2023-05-30 RX ORDER — PANTOPRAZOLE SODIUM 20 MG/1
20 TABLET, DELAYED RELEASE ORAL DAILY
Qty: 30 TABLET | Refills: 2 | Status: SHIPPED | OUTPATIENT
Start: 2023-05-30 | End: 2023-08-28

## 2023-05-30 RX ORDER — ESCITALOPRAM OXALATE 10 MG/1
10 TABLET ORAL DAILY
Qty: 90 TABLET | Refills: 3 | Status: SHIPPED | OUTPATIENT
Start: 2023-05-30

## 2023-05-30 NOTE — PROGRESS NOTES
Subjective   Adamaris Buck is a 68 y.o. female  Depression (Req refill on lexapro, req 90 day supply to mail order and needs a short term supply to local pharm)      History of Present Illness    The patient is a 68-year-old female seen today for a urinary tract infection.    The patient had no symptoms other than the back pain 2 to 3 weeks ago. She had a history of urinary tract issues when she was a child. She has a lot of scar tissue in her lower urinary tract and does not experience symptoms other than back pain. She drinks at least 1 liter of water daily. She is not allergic to any antibiotics.     The patient needs a refill on her Lexapro.    The following portions of the patient's history were reviewed and updated as appropriate: allergies, current medications, past social history and problem list    Review of Systems   Constitutional: Negative for appetite change and fatigue.   Respiratory: Negative for chest tightness and shortness of breath.    Gastrointestinal: Negative for abdominal pain, diarrhea and nausea.   Musculoskeletal: Positive for back pain.   Neurological: Negative for dizziness, tremors, weakness, light-headedness and headaches.   Psychiatric/Behavioral: Negative for agitation, behavioral problems, confusion, decreased concentration, dysphoric mood, sleep disturbance and suicidal ideas. The patient is not nervous/anxious.         Stable on medication       Objective     Vitals:    05/30/23 1612   BP: 116/74   Pulse: 64   Temp: 97.9 °F (36.6 °C)   SpO2: 97%       Physical Exam  Vitals and nursing note reviewed.   Constitutional:       General: She is not in acute distress.     Appearance: Normal appearance. She is well-developed. She is not ill-appearing, toxic-appearing or diaphoretic.   HENT:      Head: Normocephalic and atraumatic.   Eyes:      Conjunctiva/sclera: Conjunctivae normal.   Pulmonary:      Effort: Pulmonary effort is normal. No respiratory distress.   Abdominal:      Tenderness:  There is no abdominal tenderness.   Skin:     General: Skin is dry.      Coloration: Skin is not pale.      Findings: No erythema or rash.   Neurological:      Mental Status: She is alert and oriented to person, place, and time.      Coordination: Coordination normal.   Psychiatric:         Attention and Perception: She is attentive.         Mood and Affect: Mood normal.         Speech: Speech normal.         Behavior: Behavior normal.         Thought Content: Thought content normal.         Judgment: Judgment normal.         Assessment & Plan     Diagnoses and all orders for this visit:    1. Low back pain, unspecified back pain laterality, unspecified chronicity, unspecified whether sciatica present (Primary)  -     POC Urinalysis Dipstick, Automated  -     Urine Culture - , Urine, Clean Catch; Future  -     Urine Culture - Urine, Urine, Clean Catch    2. Depression, unspecified depression type  -     Discontinue: escitalopram (LEXAPRO) 10 MG tablet; Take 1 tablet by mouth Daily.  Dispense: 30 tablet; Refill: 0  -     escitalopram (LEXAPRO) 10 MG tablet; Take 1 tablet by mouth Daily.  Dispense: 90 tablet; Refill: 3    3. Urinary tract infection without hematuria, site unspecified    Other orders  -     sulfamethoxazole-trimethoprim (Bactrim DS) 800-160 MG per tablet; Take 1 tablet by mouth 2 (Two) Times a Day.  Dispense: 14 tablet; Refill: 0    Urinary tract infection  Bactrim will be prescribed. A urine culture will be ordered.     Depression  Her Lexapro will be refilled.    Transcribed from ambient dictation for Debo Reyes PA-C by Varsha Moore.  05/30/23   18:28 EDT    Patient or patient representative verbalized consent to the visit recording.  I have personally performed the services described in this document as transcribed by the above individual, and it is both accurate and complete.  Debo Reyes PA-C  5/31/2023  11:31 EDT

## 2023-05-30 NOTE — PROGRESS NOTES
PULMONARY FOLLOW-UP OUTPATIENT NOTE:     Patient ID/Chief Complaint: .Adamaris Buck is a 68 y.o. female presenting for follow up on asthma.    History of Present Illness: Adamaris Buck is a 67 y.o. female who established care with pulmonary in September 2022.  She has a longstanding history of allergic asthma.  For the most part, symptoms have been controlled with as needed albuterol and trigger avoidance.  Patient was started on Flonase and montelukast by PCP. She had used Advair intermittently since 2018/19 but notes that compliance with this medication has been limited.  At initial encounter patient endorsed a persistent cough for >6x months-- worsening possibly coincided with COVID-19 infection in January 2022. Patient unclear if cough solely due to underlying asthma or another etiology.  Cough dry, nonproductive and most commonly occurred with exertion and allergic triggers. She also noted that episodes happened in conjunction with wearing a surgical facemask. For months prior to visit she had 1-2x episodes weekly where she would wake up at night coughing.  She denied snoring, daytime somnolence/fatigue and apnea.      Interim History: Patient doing relatively well since last appointment.  Fortunately cough has improved.  At last visit she was given Advair and as needed albuterol with appropriate inhaler counseling.  She was also given a PPI [as a trial] as it seemed she had concomitant reflux which could have been exacerbating underlying asthma and/or cough. Despite cough improving, she has started to develop worsening shortness of breath.  She noticed this symptom slowly occurring but it significantly worsened after a trip to [place].  Patient became very short of breath while walking in the airport and she stayed fairly short of breath the duration of this trip.  She does not believe she is ever fully recovered from this instance.  She denies chest pain, hemoptysis, syncope, presyncope, lower extremity swelling,  painful legs/calves, redness and lower extremity, discrepancy in size between lower extremities.    PFSH: Reviewed in EMR, Significant Changes Noted Above    Review of Systems: Fourteen point review of systems performed and negative except for those issues listed in HPI    Medications:  Current Outpatient Medications   Medication Sig Dispense Refill   • albuterol sulfate  (90 Base) MCG/ACT inhaler Inhale 2 puffs Every 4 (Four) Hours As Needed for Wheezing. 18 g 11   • BIOTIN PO Take 1 tablet by mouth Daily.     • Cholecalciferol (VITAMIN D3) 5000 UNITS capsule capsule Take 5,000 Units by mouth Daily.     • cyclobenzaprine (FLEXERIL) 10 MG tablet Take 1 tablet by mouth 3 (Three) Times a Day As Needed for Muscle Spasms. 30 tablet 2   • escitalopram (LEXAPRO) 10 MG tablet TAKE 1 TABLET DAILY 90 tablet 3   • Fluticasone-Salmeterol (ADVAIR/WIXELA) 250-50 MCG/ACT DISKUS Inhale 1 puff 2 (Two) Times a Day. 60 each 7   • montelukast (SINGULAIR) 10 MG tablet Take 1 tablet by mouth Every Night. For asthma and allergies 90 tablet 3   • pantoprazole (PROTONIX) 40 MG EC tablet Take 1 tablet by mouth Daily for 180 days. 90 tablet 1     No current facility-administered medications for this visit.     Immunizations:  Chart reviewed: immunizations are up to date and documented.  Immunization History   Administered Date(s) Administered   • Fluzone High Dose =>65 Years ([employer] ONLY) 11/14/2019   • Pneumococcal Conjugate 13-Valent (PCV13) 11/14/2019   • Tdap 11/14/2019     Physical Examination:  Vitals:    05/30/23 1428   BP: 116/84   Pulse: 62   Temp: 98 °F (36.7 °C)   SpO2: 97%     General: The patient appears in no acute distress.  Alert, cooperative and interactive.   HEENT: NC/AT, PERRL, Normal nasal mucosa, MMM.  Neck: Trachea midline, No masses, No JVD.   Chest: Clear to auscultation bilaterally, No wheezing, rhonchi, or rales. No end-expiratory wheeze with forced expiratory maneuvers.  Normal work of breathing. Equal  chest rise.  Cardiac: Regular rhythm, normal rate, S1S2 auscultated. No murmurs, rubs or gallops.   Extremities: No lower extremity edema. No clubbing or cyanosis.  Neuro: Motor power grossly intact bilaterally. Speech fluid and fluent.  Thought process coherent.  Psych: Alert and oriented x 3, Mood stable.    Review of Data:  Laboratory Studies: I personally reviewed recent lab work in EMR    Radiology Results: I have personally reviewed and interpreted the images in EMR    PFT (9/20/2022):   - Personal review/interpretation: Mild airflow obstruction (FEV1/FVC 67; FEV1 93% predicted).  No restriction.  Normal diffusing capacity.    Assessment & Plan:     #Mild, persistent asthma  #Chronic cough     - Continue Advair and as needed albuterol. Continue allergy medication regimen to help with overall trigger management.  - Given obesity, swallowing symptoms, throat clearing attempted 3-month trial of PPI for GERD related cough.  Patient did in fact notice improvement after initiating PPI and underlying cough and stopped after 3x month trial with some recurrence in cough frequency and reflux symptomology.  Restarted PPI but at a lower dose of 20 mg daily   - Previously ordered CBC with differential, IgE level and alpha-1 antitrypsin given baseline obstruction on PFTs in a non-smoker. These were all relatively normal    Feel that from an asthma standpoint symptoms have improved following escalation of therapy and better controlling triggers (i.e. allergic rhinitis, GERD).  During today's visit I am concerned with relatively new onset/progressive dyspnea. The fact that symptoms worsened after beginning to travel more frequently and [most acutely] after a long flight make ruling out PE important. D-dimer ordered today.  If positive will follow-up with CTA chest. Etiology of CEBALLOS could also just be deconditioning given patient's admittance to being relatively sedentary over the last 2x years and now starting to be more  active.  Spoke at length about incorporating exercise routine into daily life.  Will assess symptoms again in 5-6x weeks before additional orders placed.    -- Cole Oliveira MD   Pulmonary/Critical Care    Level of service justified based on 32 minutes spent in patient care on this date of service including, but not limited to: preparing to see the patient, obtaining and/or reviewing history, performing medically appropriate examination, ordering tests/medicine/procedures, independently interpreting results, documenting clinical information in EHR, and counseling/education of patient/family/caregiver (excluding time spent on other separate services such as performing procedures or test interpretation, if applicable). (Level 4 30-39 minutes; Level 5 40-54 minutes)

## 2023-06-02 LAB — BACTERIA SPEC AEROBE CULT: ABNORMAL

## 2023-09-03 DIAGNOSIS — J30.1 NON-SEASONAL ALLERGIC RHINITIS DUE TO POLLEN: ICD-10-CM

## 2023-09-06 RX ORDER — MONTELUKAST SODIUM 10 MG/1
TABLET ORAL
Qty: 90 TABLET | Refills: 3 | Status: SHIPPED | OUTPATIENT
Start: 2023-09-06

## 2023-11-02 ENCOUNTER — OFFICE VISIT (OUTPATIENT)
Dept: PULMONOLOGY | Facility: CLINIC | Age: 68
End: 2023-11-02
Payer: COMMERCIAL

## 2023-11-02 VITALS
HEIGHT: 62 IN | DIASTOLIC BLOOD PRESSURE: 74 MMHG | BODY MASS INDEX: 34.96 KG/M2 | HEART RATE: 60 BPM | OXYGEN SATURATION: 95 % | TEMPERATURE: 97.3 F | SYSTOLIC BLOOD PRESSURE: 118 MMHG | WEIGHT: 190 LBS

## 2023-11-02 DIAGNOSIS — J45.30 MILD PERSISTENT ASTHMA WITHOUT COMPLICATION: Primary | ICD-10-CM

## 2023-11-02 DIAGNOSIS — J30.1 NON-SEASONAL ALLERGIC RHINITIS DUE TO POLLEN: ICD-10-CM

## 2023-11-02 PROCEDURE — 99213 OFFICE O/P EST LOW 20 MIN: CPT | Performed by: NURSE PRACTITIONER

## 2023-11-02 NOTE — PROGRESS NOTES
"[facility] Pulmonary Follow up      Chief Complaint  Follow-up    Subjective          Adamaris Buck presents to [facility] for routine follow-up.  She was last seen in the office here by Dr. Oliveira in July.  He follows her for asthma with a chronic cough.    She has been on her Advair and uses it most days.  She occasionally has to use her albuterol.  She used it recently in [place] when she was out of town.    She also has a history of chronic reflux causing her chronic cough.  She initially was on a PPI but is currently not taking it.  Recently she has been using some apple cider vinegar at night and cutting down on the mat of meals and water she drinks at the end of the day.  She does feel like that has helped quite a bit.        Objective     Vital Signs:   /74 (BP Location: Left arm, Patient Position: Sitting, Cuff Size: Adult)   Pulse 60   Temp 97.3 °F (36.3 °C)   Ht 157.5 cm (62\")   Wt 86.2 kg (190 lb)   SpO2 95% Comment: Room air at rest  BMI 34.75 kg/m²       Immunization History   Administered Date(s) Administered    COVID-19 (RENEE) 04/08/2021    Fluzone High Dose =>65 Years ([employer] ONLY) 11/14/2019    Fluzone High-Dose 65+yrs 11/12/2021    Pneumococcal Conjugate 13-Valent (PCV13) 11/14/2019    Shingrix 01/31/2020, 08/04/2020    Tdap 11/14/2019       Physical Exam  Vitals reviewed.   Constitutional:       Appearance: She is well-developed.   HENT:      Head: Normocephalic and atraumatic.   Eyes:      Pupils: Pupils are equal, round, and reactive to light.   Cardiovascular:      Rate and Rhythm: Normal rate and regular rhythm.      Heart sounds: No murmur heard.  Pulmonary:      Effort: Pulmonary effort is normal. No respiratory distress.      Breath sounds: Normal breath sounds. No wheezing or rales.   Abdominal:      General: Bowel sounds are normal. There is no distension.      Palpations: Abdomen is soft.   Musculoskeletal:         General: " Normal range of motion.      Cervical back: Normal range of motion and neck supple.   Skin:     General: Skin is warm and dry.      Findings: No erythema.   Neurological:      Mental Status: She is alert and oriented to person, place, and time.   Psychiatric:         Behavior: Behavior normal.          Result Review :                           Assessment and Plan    Problem List Items Addressed This Visit          Allergies and Adverse Reactions    Non-seasonal allergic rhinitis due to pollen       Pulmonary and Pneumonias    Mild persistent asthma without complication - Primary       She is here today for routine follow-up on her asthma.  Currently she doing quite well.  She is walking in airports without difficulties.  She said no recent acute issue or exacerbation.  She has an Advair she uses mostly as needed seasonally.    Routine follow-up annually with full PFTs and chest x-rays, sooner if any issues.    Follow Up     No follow-ups on file.  Patient was given instructions and counseling regarding her condition or for health maintenance advice. Please see specific information pulled into the AVS if appropriate.       KIMBERLY Pimentel, ACNP  [facility]  Electronically signed

## 2024-03-11 ENCOUNTER — PATIENT MESSAGE (OUTPATIENT)
Dept: PULMONOLOGY | Facility: CLINIC | Age: 69
End: 2024-03-11
Payer: COMMERCIAL

## 2024-03-11 DIAGNOSIS — J45.30 MILD PERSISTENT ASTHMA WITHOUT COMPLICATION: Primary | ICD-10-CM

## 2024-03-11 DIAGNOSIS — J45.30 MILD PERSISTENT ASTHMA WITHOUT COMPLICATION: ICD-10-CM

## 2024-03-11 RX ORDER — FLUTICASONE PROPIONATE AND SALMETEROL 250; 50 UG/1; UG/1
1 POWDER RESPIRATORY (INHALATION)
Qty: 60 EACH | Refills: 3 | Status: SHIPPED | OUTPATIENT
Start: 2024-03-11 | End: 2024-03-11 | Stop reason: SDUPTHER

## 2024-03-11 RX ORDER — FLUTICASONE PROPIONATE AND SALMETEROL 250; 50 UG/1; UG/1
1 POWDER RESPIRATORY (INHALATION)
Qty: 3 EACH | Refills: 3 | Status: SHIPPED | OUTPATIENT
Start: 2024-03-11

## 2024-03-11 NOTE — TELEPHONE ENCOUNTER
From: Adamaris Buck  To: Willy Oliveira  Sent: 3/11/2024 12:39 PM EDT  Subject: Need refil for Advair 250/50    Hi Dr Oliveira,    I hope this finds you well. Please phone [facility] for [facility]ill for the above medication. [phone] when you have a chance.    I have been stable on our current med regimen. Thanks so much for helping stablize my asthma/allergies.    Of note, I had a terrible flu that lasted for 2 weeks. Huge upper and lower respiratory congestion. But I persevered!!!    Thanks for all you do, mm

## 2024-04-02 DIAGNOSIS — J30.1 NON-SEASONAL ALLERGIC RHINITIS DUE TO POLLEN: ICD-10-CM

## 2024-04-02 DIAGNOSIS — J45.30 MILD PERSISTENT ASTHMA WITHOUT COMPLICATION: ICD-10-CM

## 2024-04-02 RX ORDER — FLUTICASONE PROPIONATE AND SALMETEROL 250; 50 UG/1; UG/1
1 POWDER RESPIRATORY (INHALATION)
Qty: 180 EACH | Refills: 3 | Status: SHIPPED | OUTPATIENT
Start: 2024-04-02

## 2024-04-02 RX ORDER — MONTELUKAST SODIUM 10 MG/1
10 TABLET ORAL NIGHTLY
Qty: 90 TABLET | Refills: 3 | Status: SHIPPED | OUTPATIENT
Start: 2024-04-02

## 2024-09-03 DIAGNOSIS — F32.A DEPRESSION, UNSPECIFIED DEPRESSION TYPE: ICD-10-CM

## 2024-09-03 RX ORDER — ESCITALOPRAM OXALATE 10 MG/1
10 TABLET ORAL DAILY
Qty: 30 TABLET | Refills: 0 | Status: SHIPPED | OUTPATIENT
Start: 2024-09-03

## 2024-09-03 NOTE — TELEPHONE ENCOUNTER
Caller: Adamaris Buck    Relationship: Self    Best call back number: [phone]    Requested Prescriptions:   Requested Prescriptions     Pending Prescriptions Disp Refills    escitalopram (LEXAPRO) 10 MG tablet 90 tablet 3     Sig: Take 1 tablet by mouth Daily.        [facility] [place] [facility] [address] [facility] AT [address] TO [facility] - [phone]  - [phone] FX     Last office visit with prescribing clinician: 5/30/2023   Last telemedicine visit with prescribing clinician: Visit date not found   Next office visit with prescribing clinician: 9/24/2024     Additional details provided by patient:     Does the patient have less than a 3 day supply:  [x] Yes  [] No    Would you like a call back once the refill request has been completed: [x] Yes [] No    If the office needs to give you a call back, can they leave a voicemail: [x] Yes [] No    Huyen Mcintyre Rep   09/03/24 10:02 EDT

## 2024-09-24 ENCOUNTER — OFFICE VISIT (OUTPATIENT)
Dept: FAMILY MEDICINE CLINIC | Facility: CLINIC | Age: 69
End: 2024-09-24
Payer: COMMERCIAL

## 2024-09-24 VITALS
TEMPERATURE: 98.7 F | HEART RATE: 68 BPM | SYSTOLIC BLOOD PRESSURE: 126 MMHG | OXYGEN SATURATION: 98 % | WEIGHT: 196.6 LBS | DIASTOLIC BLOOD PRESSURE: 80 MMHG | HEIGHT: 62 IN | BODY MASS INDEX: 36.18 KG/M2

## 2024-09-24 DIAGNOSIS — Z12.31 BREAST CANCER SCREENING BY MAMMOGRAM: ICD-10-CM

## 2024-09-24 DIAGNOSIS — F32.A DEPRESSION, UNSPECIFIED DEPRESSION TYPE: ICD-10-CM

## 2024-09-24 DIAGNOSIS — J45.30 MILD PERSISTENT ASTHMA WITHOUT COMPLICATION: Primary | ICD-10-CM

## 2024-09-24 PROCEDURE — 99213 OFFICE O/P EST LOW 20 MIN: CPT | Performed by: PHYSICIAN ASSISTANT

## 2024-09-24 RX ORDER — ALBUTEROL SULFATE 90 UG/1
2 INHALANT RESPIRATORY (INHALATION) EVERY 4 HOURS PRN
Qty: 18 G | Refills: 11 | Status: SHIPPED | OUTPATIENT
Start: 2024-09-24

## 2024-09-24 RX ORDER — CHLORAL HYDRATE 500 MG
CAPSULE ORAL
COMMUNITY

## 2024-09-24 RX ORDER — ESCITALOPRAM OXALATE 10 MG/1
10 TABLET ORAL DAILY
Qty: 90 TABLET | Refills: 3 | Status: SHIPPED | OUTPATIENT
Start: 2024-09-24

## 2024-10-01 NOTE — TELEPHONE ENCOUNTER
Chief Complaint   Patient presents with    Medication Refill     Last Appointment with Dr. Arnol Cervantes:  8/20/2024   Future Appointments   Date Time Provider Department Center   12/11/2024 10:40 AM Arnol Cervantes MD [facility] DEP      Clearly this message makes no sense, please give me the details that I need to answer this message correctly.

## 2025-05-18 DIAGNOSIS — J30.1 NON-SEASONAL ALLERGIC RHINITIS DUE TO POLLEN: ICD-10-CM

## 2025-05-19 RX ORDER — MONTELUKAST SODIUM 10 MG/1
10 TABLET ORAL NIGHTLY
Qty: 90 TABLET | Refills: 3 | OUTPATIENT
Start: 2025-05-19

## 2025-06-13 DIAGNOSIS — J45.30 MILD PERSISTENT ASTHMA WITHOUT COMPLICATION: ICD-10-CM

## 2025-06-13 RX ORDER — FLUTICASONE PROPIONATE AND SALMETEROL 250; 50 UG/1; UG/1
POWDER RESPIRATORY (INHALATION)
Refills: 3 | OUTPATIENT
Start: 2025-06-13